# Patient Record
(demographics unavailable — no encounter records)

---

## 2025-03-18 NOTE — HEALTH RISK ASSESSMENT
[No] : In the past 12 months have you used drugs other than those required for medical reasons? No [0] : 2) Feeling down, depressed, or hopeless: Not at all (0) [PHQ-2 Negative - No further assessment needed] : PHQ-2 Negative - No further assessment needed [None] : None [With Family] : lives with family [Employed] : employed [] :  [Fully functional (bathing, dressing, toileting, transferring, walking, feeding)] : Fully functional (bathing, dressing, toileting, transferring, walking, feeding) [Fully functional (using the telephone, shopping, preparing meals, housekeeping, doing laundry, using] : Fully functional and needs no help or supervision to perform IADLs (using the telephone, shopping, preparing meals, housekeeping, doing laundry, using transportation, managing medications and managing finances) [Never] : Never [de-identified] : cardio, weights [Audit-CScore] : 0 [de-identified] : balanced [[id number]] : 0 [HIV test declined] : HIV test declined [Hepatitis C test declined] : Hepatitis C test declined [Change in mental status noted] : No change in mental status noted [de-identified] : Ages 17, 12 [FreeTextEntry2] : [employer]

## 2025-03-18 NOTE — HISTORY OF PRESENT ILLNESS
[FreeTextEntry1] : Establish care, CPE [de-identified] : 46 yo male presents to establish care, CPE. Last CPE was 1-2 years ago. Needs form completed for work, works as missionary. Patient thinks last colonoscopy was about 10 years ago in [place].  Patient dealing with various MSK related issues, has seen PT for knee/elbow pain. Overall feeling well today.

## 2025-06-03 NOTE — ASSESSMENT
[FreeTextEntry1] : Impression: #Avg risk CRC screening  Plan: - Schedule colonoscopy for avg risk screening - Risks (including anesthesia complications, bleeding, infection, perforation) as well as benefits, alternatives, and details of procedure discussed w/ patient. - Prep instructions discussed at length and written materials provided. - Suprep prep ordered. - Need for chaperone discussed.

## 2025-06-03 NOTE — HISTORY OF PRESENT ILLNESS
[FreeTextEntry1] : JENNY LYNCH is a 47 year M here for CRC screening   PMHx: Denies PSHx: Denies  Prior Colonoscopy: Denies Prior EGD: 10 years ago in [place], as part of [place], was normal   Current symptoms: Denies all GI sx    [ ] Acid Reflux [ ] Dysphagia [ ] Nausea/Vomiting [ ] Abdominal pain [ ] Bloating [ ] Early satiety [ ] Diarrhea [ ] Constipation [ ] Melena [ ] Hematochezia [ ] Unintentional Weight Loss    Current Meds: Denies [ ] NSAID Use   All: NKDA FHx:  No known family history of cancer in the esophagus, stomach, colon, small bowel, pancreas, or liver. No known family history of ulcers, gallstones, or IBD. SHx: [ ] Tobacco - denies [ ] EtOH - denies  [ ] Cannabis - denies  [ ] Other drug use - denies  [ ] Occupation - works as a / [ ] Birth place - in [place]   Relevant Exam: Well appearing   Labs: CBC reviewed, no anemia or microcytosis.  Leukopenia noted CMP reviewed, normal liver enzymes A1c reviewed TSH reviewed